# Patient Record
Sex: FEMALE | Race: WHITE | NOT HISPANIC OR LATINO | URBAN - METROPOLITAN AREA
[De-identification: names, ages, dates, MRNs, and addresses within clinical notes are randomized per-mention and may not be internally consistent; named-entity substitution may affect disease eponyms.]

---

## 2021-06-15 ENCOUNTER — APPOINTMENT (RX ONLY)
Dept: URBAN - METROPOLITAN AREA CLINIC 64 | Facility: CLINIC | Age: 86
Setting detail: DERMATOLOGY
End: 2021-06-15

## 2021-06-15 VITALS — DIASTOLIC BLOOD PRESSURE: 78 MMHG | SYSTOLIC BLOOD PRESSURE: 126 MMHG | TEMPERATURE: 98.2 F

## 2021-06-15 DIAGNOSIS — L57.0 ACTINIC KERATOSIS: ICD-10-CM

## 2021-06-15 DIAGNOSIS — L82.1 OTHER SEBORRHEIC KERATOSIS: ICD-10-CM

## 2021-06-15 DIAGNOSIS — L82.0 INFLAMED SEBORRHEIC KERATOSIS: ICD-10-CM

## 2021-06-15 DIAGNOSIS — L81.4 OTHER MELANIN HYPERPIGMENTATION: ICD-10-CM

## 2021-06-15 PROCEDURE — 17110 DESTRUCTION B9 LES UP TO 14: CPT

## 2021-06-15 PROCEDURE — 99213 OFFICE O/P EST LOW 20 MIN: CPT | Mod: 25

## 2021-06-15 PROCEDURE — ? COUNSELING

## 2021-06-15 PROCEDURE — ? LIQUID NITROGEN

## 2021-06-15 ASSESSMENT — LOCATION DETAILED DESCRIPTION DERM
LOCATION DETAILED: LEFT INFERIOR ANTERIOR NECK
LOCATION DETAILED: LEFT SUPERIOR UPPER BACK
LOCATION DETAILED: LEFT FOREHEAD

## 2021-06-15 ASSESSMENT — LOCATION SIMPLE DESCRIPTION DERM
LOCATION SIMPLE: LEFT FOREHEAD
LOCATION SIMPLE: LEFT ANTERIOR NECK
LOCATION SIMPLE: LEFT UPPER BACK

## 2021-06-15 ASSESSMENT — LOCATION ZONE DERM
LOCATION ZONE: NECK
LOCATION ZONE: TRUNK
LOCATION ZONE: FACE

## 2021-06-15 NOTE — PROCEDURE: MIPS QUALITY
Detail Level: Detailed
Quality 317: Preventative Care And Screening: Screening For High Blood Pressure And Follow-Up Documented: Pre-hypertensive or hypertensive blood pressure reading documented, and the indicated follow-up is documented
Additional Notes: Patient referred to PCP.

## 2021-06-15 NOTE — PROCEDURE: LIQUID NITROGEN
Include Z78.9 (Other Specified Conditions Influencing Health Status) As An Associated Diagnosis?: No
Medical Necessity Information: It is in your best interest to select a reason for this procedure from the list below. All of these items fulfill various CMS LCD requirements except the new and changing color options.
Duration Of Freeze Thaw-Cycle (Seconds): 0
Total Number Of Lesions Treated: 7
Medical Necessity Clause: This procedure was medically necessary because the lesions that were treated were:
Consent: The patient's consent was obtained including any potential scarring.
Detail Level: Zone
Post-care instructions were discussed.

## 2022-06-07 ENCOUNTER — APPOINTMENT (RX ONLY)
Dept: URBAN - METROPOLITAN AREA CLINIC 64 | Facility: CLINIC | Age: 87
Setting detail: DERMATOLOGY
End: 2022-06-07

## 2022-06-07 VITALS — HEART RATE: 78 BPM | DIASTOLIC BLOOD PRESSURE: 71 MMHG | TEMPERATURE: 97.8 F | SYSTOLIC BLOOD PRESSURE: 120 MMHG

## 2022-06-07 DIAGNOSIS — L57.0 ACTINIC KERATOSIS: ICD-10-CM

## 2022-06-07 DIAGNOSIS — L81.4 OTHER MELANIN HYPERPIGMENTATION: ICD-10-CM

## 2022-06-07 DIAGNOSIS — L82.1 OTHER SEBORRHEIC KERATOSIS: ICD-10-CM

## 2022-06-07 PROCEDURE — 99213 OFFICE O/P EST LOW 20 MIN: CPT

## 2022-06-07 PROCEDURE — ? COUNSELING

## 2022-06-07 NOTE — PROCEDURE: MIPS QUALITY
Quality 110: Preventive Care And Screening: Influenza Immunization: Influenza Immunization Administered during Influenza season
Quality 317: Preventative Care And Screening: Screening For High Blood Pressure And Follow-Up Documented: Pre-hypertensive or hypertensive blood pressure reading documented, and the indicated follow-up is documented
Detail Level: Detailed
Additional Notes: Patient referred to PCP.
Quality 111:Pneumonia Vaccination Status For Older Adults: Pneumococcal vaccine administered on or after patient’s 60th birthday and before the end of the measurement period

## 2023-06-13 ENCOUNTER — APPOINTMENT (RX ONLY)
Dept: URBAN - METROPOLITAN AREA CLINIC 64 | Facility: CLINIC | Age: 88
Setting detail: DERMATOLOGY
End: 2023-06-13

## 2023-06-13 VITALS — TEMPERATURE: 97.8 F | SYSTOLIC BLOOD PRESSURE: 131 MMHG | HEART RATE: 71 BPM | DIASTOLIC BLOOD PRESSURE: 71 MMHG

## 2023-06-13 DIAGNOSIS — L57.0 ACTINIC KERATOSIS: ICD-10-CM

## 2023-06-13 DIAGNOSIS — L82.1 OTHER SEBORRHEIC KERATOSIS: ICD-10-CM

## 2023-06-13 DIAGNOSIS — L81.4 OTHER MELANIN HYPERPIGMENTATION: ICD-10-CM

## 2023-06-13 DIAGNOSIS — L82.0 INFLAMED SEBORRHEIC KERATOSIS: ICD-10-CM

## 2023-06-13 DIAGNOSIS — L72.8 OTHER FOLLICULAR CYSTS OF THE SKIN AND SUBCUTANEOUS TISSUE: ICD-10-CM

## 2023-06-13 PROCEDURE — 10060 I&D ABSCESS SIMPLE/SINGLE: CPT

## 2023-06-13 PROCEDURE — ? INCISION AND DRAINAGE

## 2023-06-13 PROCEDURE — 99213 OFFICE O/P EST LOW 20 MIN: CPT | Mod: 25

## 2023-06-13 PROCEDURE — 17110 DESTRUCTION B9 LES UP TO 14: CPT

## 2023-06-13 PROCEDURE — ? LIQUID NITROGEN

## 2023-06-13 PROCEDURE — ? COUNSELING

## 2023-06-13 ASSESSMENT — LOCATION SIMPLE DESCRIPTION DERM
LOCATION SIMPLE: LEFT ANTERIOR NECK
LOCATION SIMPLE: RIGHT CHEEK

## 2023-06-13 ASSESSMENT — LOCATION DETAILED DESCRIPTION DERM
LOCATION DETAILED: LEFT CLAVICULAR NECK
LOCATION DETAILED: RIGHT CENTRAL MALAR CHEEK

## 2023-06-13 ASSESSMENT — LOCATION ZONE DERM
LOCATION ZONE: NECK
LOCATION ZONE: FACE

## 2023-06-13 NOTE — PROCEDURE: INCISION AND DRAINAGE
Wound Care: Petrolatum
Preparation Text: The area was prepped in the usual clean fashion.
Method: sterile needle
Anesthesia Volume In Cc: 0
Detail Level: Detailed
Dressing: dry sterile dressing
Curette: No
Consent was obtained.
Post-Care Instructions: I reviewed with the patient in detail post-care instructions. Patient should keep wound covered and call the office should any redness, pain, swelling or worsening occur.
Curette Text (Optional): After the contents were expressed a curette was used to partially remove the cyst wall.
Suture Text: The incision was partially closed with
Size Of Lesion In Cm (Optional But May Be Required For Some Insurances): 0.3
Epidermal Sutures: 4-0 Ethilon
Epidermal Closure: simple interrupted
Body Location Override (Optional - Billing Will Still Be Based On Selected Body Map Location If Applicable): (R) cheek
Lesion Type: Cyst

## 2023-06-13 NOTE — PROCEDURE: LIQUID NITROGEN
Render Post Care In The Note?: no
Post-care instructions were discussed.
Show Spray Paint Technique Variable?: Yes
Total Number Of Lesions Treated: 2
Consent: The patient's consent was obtained including any potential scarring.
Detail Level: Zone
Medical Necessity Clause: This procedure was medically necessary because the lesions that were treated were:
Duration Of Freeze Thaw-Cycle (Seconds): 0
Spray Paint Text: The liquid nitrogen was applied to the skin utilizing a spray paint frosting technique.
Medical Necessity Information: It is in your best interest to select a reason for this procedure from the list below. All of these items fulfill various CMS LCD requirements except the new and changing color options.

## 2023-06-13 NOTE — PROCEDURE: MIPS QUALITY
Additional Notes: Patient referred to PCP
Detail Level: Detailed
Quality 317: Preventative Care And Screening: Screening For High Blood Pressure And Follow-Up Documented: Elevated or hypertensive blood pressure reading documented, and the indicated follow-up is documented

## 2023-08-01 ENCOUNTER — APPOINTMENT (RX ONLY)
Dept: URBAN - METROPOLITAN AREA CLINIC 64 | Facility: CLINIC | Age: 88
Setting detail: DERMATOLOGY
End: 2023-08-01

## 2023-08-01 VITALS — TEMPERATURE: 97.8 F | SYSTOLIC BLOOD PRESSURE: 143 MMHG | DIASTOLIC BLOOD PRESSURE: 72 MMHG | HEART RATE: 70 BPM

## 2023-08-01 DIAGNOSIS — D485 NEOPLASM OF UNCERTAIN BEHAVIOR OF SKIN: ICD-10-CM

## 2023-08-01 PROBLEM — D48.5 NEOPLASM OF UNCERTAIN BEHAVIOR OF SKIN: Status: ACTIVE | Noted: 2023-08-01

## 2023-08-01 PROCEDURE — 11102 TANGNTL BX SKIN SINGLE LES: CPT

## 2023-08-01 PROCEDURE — ? BIOPSY BY SHAVE METHOD

## 2023-08-01 ASSESSMENT — LOCATION SIMPLE DESCRIPTION DERM: LOCATION SIMPLE: SCALP

## 2023-08-01 ASSESSMENT — LOCATION ZONE DERM: LOCATION ZONE: SCALP

## 2023-08-01 ASSESSMENT — LOCATION DETAILED DESCRIPTION DERM: LOCATION DETAILED: RIGHT SUPERIOR PARIETAL SCALP

## 2023-08-01 NOTE — PROCEDURE: MIPS QUALITY
Additional Notes: Patient referred to PCP.
Detail Level: Detailed
Quality 317: Preventative Care And Screening: Screening For High Blood Pressure And Follow-Up Documented: Elevated or hypertensive blood pressure reading documented, and the indicated follow-up is documented

## 2023-08-01 NOTE — PROCEDURE: BIOPSY BY SHAVE METHOD
Information: Selecting Yes will display possible errors in your note based on the variables you have selected. This validation is only offered as a suggestion for you. PLEASE NOTE THAT THE VALIDATION TEXT WILL BE REMOVED WHEN YOU FINALIZE YOUR NOTE. IF YOU WANT TO FAX A PRELIMINARY NOTE YOU WILL NEED TO TOGGLE THIS TO 'NO' IF YOU DO NOT WANT IT IN YOUR FAXED NOTE.
Anesthesia Volume In Cc: 0.5
Path Notes (To The Dermatopathologist): Erythematous papule. R/O BCC
Was A Bandage Applied: Yes
Cryotherapy Text: The wound bed was treated with cryotherapy after the biopsy was performed.
Billing Type: Third-Party Bill
Bill 95122 For Specimen Handling/Conveyance To Laboratory?: no
Body Location Override (Optional - Billing Will Still Be Based On Selected Body Map Location If Applicable): (R) medial vertex scalp
Type Of Destruction Used: Curettage
Dressing: bandage
Detail Level: Detailed
Consent: Written consent was obtained and risks were reviewed including potential scarring.
Curettage Text: The wound bed was treated with curettage after the biopsy was performed.
Notification Instructions: Patient will call the office in one week for bx results
Biopsy Method: 15 blade
Wound Care: Petrolatum
Additional Anesthesia Volume In Cc (Will Not Render If 0): 0
Electrodesiccation Text: The wound bed was treated with electrodesiccation after the biopsy was performed.
Depth Of Biopsy: dermis
Anesthesia Type: 1% lidocaine with 1:200,000 epinephrine and a 1:10 solution of 8.4% sodium bicarbonate
Biopsy Type: H and E
Silver Nitrate Text: The wound bed was treated with silver nitrate after the biopsy was performed.
Electrodesiccation And Curettage Text: The wound bed was treated with electrodesiccation and curettage after the biopsy was performed.
Size Of Lesion In Cm: 0.4
Post-Care Instructions: The patient was given post-care instructions. Patient is to keep the biopsy site dry overnight, and then apply petrolatum twice daily until healed. May cover with a bandaid
Hemostasis: Aluminum Chloride

## 2023-12-12 ENCOUNTER — APPOINTMENT (RX ONLY)
Dept: URBAN - METROPOLITAN AREA CLINIC 65 | Facility: CLINIC | Age: 88
Setting detail: DERMATOLOGY
End: 2023-12-12

## 2023-12-12 DIAGNOSIS — L57.0 ACTINIC KERATOSIS: ICD-10-CM

## 2023-12-12 DIAGNOSIS — L82.0 INFLAMED SEBORRHEIC KERATOSIS: ICD-10-CM

## 2023-12-12 DIAGNOSIS — L81.4 OTHER MELANIN HYPERPIGMENTATION: ICD-10-CM

## 2023-12-12 DIAGNOSIS — L82.1 OTHER SEBORRHEIC KERATOSIS: ICD-10-CM

## 2023-12-12 PROCEDURE — 99213 OFFICE O/P EST LOW 20 MIN: CPT | Mod: 25

## 2023-12-12 PROCEDURE — ? COUNSELING

## 2023-12-12 PROCEDURE — 17110 DESTRUCTION B9 LES UP TO 14: CPT

## 2023-12-12 PROCEDURE — ? LIQUID NITROGEN

## 2023-12-12 ASSESSMENT — LOCATION SIMPLE DESCRIPTION DERM: LOCATION SIMPLE: RIGHT FOREHEAD

## 2023-12-12 ASSESSMENT — LOCATION DETAILED DESCRIPTION DERM: LOCATION DETAILED: RIGHT SUPERIOR FOREHEAD

## 2023-12-12 ASSESSMENT — LOCATION ZONE DERM: LOCATION ZONE: FACE

## 2023-12-12 NOTE — PROCEDURE: LIQUID NITROGEN
Spray Paint Technique: No
Consent: The patient's consent was obtained including but not limited to risks of crusting, scabbing, blistering, scarring, darker or lighter pigmentary change, recurrence, incomplete removal and infection.
Medical Necessity Clause: This procedure was medically necessary because the lesions that were treated were:
Spray Paint Text: The liquid nitrogen was applied to the skin utilizing a spray paint frosting technique.
Duration Of Freeze Thaw-Cycle (Seconds): 0
Total Number Of Lesions Treated: 1
Show Spray Paint Technique Variable?: Yes
Detail Level: Zone
Medical Necessity Information: It is in your best interest to select a reason for this procedure from the list below. All of these items fulfill various CMS LCD requirements except the new and changing color options.
Post-Care Instructions: I reviewed with the patient in detail post-care instructions. Patient is to wear sunprotection, and avoid picking at any of the treated lesions. Pt may apply Vaseline to crusted or scabbing areas.

## 2024-03-13 ENCOUNTER — APPOINTMENT (RX ONLY)
Dept: URBAN - METROPOLITAN AREA CLINIC 65 | Facility: CLINIC | Age: 89
Setting detail: DERMATOLOGY
End: 2024-03-13

## 2024-03-13 DIAGNOSIS — L0390 CELLULITIS AND ABSCESS OF UNSPECIFIED SITES: ICD-10-CM

## 2024-03-13 DIAGNOSIS — L0391 CELLULITIS AND ABSCESS OF UNSPECIFIED SITES: ICD-10-CM

## 2024-03-13 PROBLEM — L02.811 CUTANEOUS ABSCESS OF HEAD [ANY PART, EXCEPT FACE]: Status: ACTIVE | Noted: 2024-03-13

## 2024-03-13 PROCEDURE — ? INCISION AND DRAINAGE

## 2024-03-13 PROCEDURE — 10060 I&D ABSCESS SIMPLE/SINGLE: CPT

## 2024-03-13 PROCEDURE — ? PRESCRIPTION

## 2024-03-13 PROCEDURE — ? ORDER TESTS

## 2024-03-13 RX ORDER — CEPHALEXIN 500 MG/1
CAPSULE ORAL TID
Qty: 21 | Refills: 0 | Status: ERX | COMMUNITY
Start: 2024-03-13

## 2024-03-13 RX ADMIN — CEPHALEXIN: 500 CAPSULE ORAL at 00:00

## 2024-03-13 ASSESSMENT — LOCATION DETAILED DESCRIPTION DERM: LOCATION DETAILED: RIGHT SUPERIOR PARIETAL SCALP

## 2024-03-13 ASSESSMENT — LOCATION ZONE DERM: LOCATION ZONE: SCALP

## 2024-03-13 ASSESSMENT — LOCATION SIMPLE DESCRIPTION DERM: LOCATION SIMPLE: SCALP

## 2024-03-13 NOTE — PROCEDURE: ORDER TESTS
Bill For Surgical Tray: no
Performing Laboratory: 0
Expected Date Of Service: 03/15/2024
Billing Type: Third-Party Bill

## 2024-06-12 ENCOUNTER — APPOINTMENT (RX ONLY)
Dept: URBAN - METROPOLITAN AREA CLINIC 65 | Facility: CLINIC | Age: 89
Setting detail: DERMATOLOGY
End: 2024-06-12

## 2024-06-12 DIAGNOSIS — L81.4 OTHER MELANIN HYPERPIGMENTATION: ICD-10-CM

## 2024-06-12 DIAGNOSIS — L82.0 INFLAMED SEBORRHEIC KERATOSIS: ICD-10-CM

## 2024-06-12 DIAGNOSIS — L82.1 OTHER SEBORRHEIC KERATOSIS: ICD-10-CM

## 2024-06-12 DIAGNOSIS — L57.0 ACTINIC KERATOSIS: ICD-10-CM

## 2024-06-12 PROCEDURE — 99213 OFFICE O/P EST LOW 20 MIN: CPT | Mod: 25

## 2024-06-12 PROCEDURE — 17110 DESTRUCTION B9 LES UP TO 14: CPT

## 2024-06-12 PROCEDURE — ? COUNSELING

## 2024-06-12 PROCEDURE — ? LIQUID NITROGEN

## 2024-06-12 ASSESSMENT — LOCATION SIMPLE DESCRIPTION DERM: LOCATION SIMPLE: RIGHT CALF

## 2024-06-12 ASSESSMENT — LOCATION ZONE DERM: LOCATION ZONE: LEG

## 2024-06-12 ASSESSMENT — LOCATION DETAILED DESCRIPTION DERM: LOCATION DETAILED: RIGHT DISTAL CALF

## 2024-06-12 NOTE — PROCEDURE: LIQUID NITROGEN
Spray Paint Technique: No
Medical Necessity Clause: This procedure was medically necessary because the lesions that were treated were:
Medical Necessity Information: It is in your best interest to select a reason for this procedure from the list below. All of these items fulfill various CMS LCD requirements except the new and changing color options.
Spray Paint Text: The liquid nitrogen was applied to the skin utilizing a spray paint frosting technique.
Detail Level: Zone
Total Number Of Lesions Treated: 1
Show Spray Paint Technique Variable?: Yes
Post-Care Instructions: I reviewed with the patient in detail post-care instructions. Patient is to wear sunprotection, and avoid picking at any of the treated lesions. Pt may apply Vaseline to crusted or scabbing areas.
Consent: The patient's consent was obtained including but not limited to risks of crusting, scabbing, blistering, scarring, darker or lighter pigmentary change, recurrence, incomplete removal and infection.
Duration Of Freeze Thaw-Cycle (Seconds): 0

## 2024-06-12 NOTE — PROCEDURE: MIPS QUALITY
Quality 226: Preventive Care And Screening: Tobacco Use: Screening And Cessation Intervention: Patient screened for tobacco use and is an ex/non-smoker
Detail Level: Detailed
Quality 130: Documentation Of Current Medications In The Medical Record: Current Medications Documented
Quality 431: Preventive Care And Screening: Unhealthy Alcohol Use - Screening: Patient not identified as an unhealthy alcohol user when screened for unhealthy alcohol use using a systematic screening method
WDL

## 2024-12-11 ENCOUNTER — APPOINTMENT (OUTPATIENT)
Dept: URBAN - METROPOLITAN AREA CLINIC 65 | Facility: CLINIC | Age: 89
Setting detail: DERMATOLOGY
End: 2024-12-11

## 2024-12-11 DIAGNOSIS — L81.4 OTHER MELANIN HYPERPIGMENTATION: ICD-10-CM

## 2024-12-11 DIAGNOSIS — L82.0 INFLAMED SEBORRHEIC KERATOSIS: ICD-10-CM

## 2024-12-11 DIAGNOSIS — L57.0 ACTINIC KERATOSIS: ICD-10-CM

## 2024-12-11 DIAGNOSIS — L82.1 OTHER SEBORRHEIC KERATOSIS: ICD-10-CM

## 2024-12-11 PROCEDURE — 99213 OFFICE O/P EST LOW 20 MIN: CPT | Mod: 25

## 2024-12-11 PROCEDURE — 17111 DESTRUCTION B9 LESIONS 15/>: CPT

## 2024-12-11 PROCEDURE — ? LIQUID NITROGEN

## 2024-12-11 PROCEDURE — ? COUNSELING

## 2024-12-11 ASSESSMENT — LOCATION SIMPLE DESCRIPTION DERM: LOCATION SIMPLE: SUPERIOR FOREHEAD

## 2024-12-11 ASSESSMENT — LOCATION ZONE DERM: LOCATION ZONE: FACE

## 2024-12-11 ASSESSMENT — LOCATION DETAILED DESCRIPTION DERM: LOCATION DETAILED: SUPERIOR MID FOREHEAD

## 2025-06-12 ENCOUNTER — APPOINTMENT (OUTPATIENT)
Dept: URBAN - METROPOLITAN AREA CLINIC 65 | Facility: CLINIC | Age: OVER 89
Setting detail: DERMATOLOGY
End: 2025-06-12

## 2025-06-12 DIAGNOSIS — L82.1 OTHER SEBORRHEIC KERATOSIS: ICD-10-CM

## 2025-06-12 DIAGNOSIS — L82.0 INFLAMED SEBORRHEIC KERATOSIS: ICD-10-CM

## 2025-06-12 DIAGNOSIS — L57.0 ACTINIC KERATOSIS: ICD-10-CM

## 2025-06-12 DIAGNOSIS — L81.4 OTHER MELANIN HYPERPIGMENTATION: ICD-10-CM

## 2025-06-12 PROCEDURE — ? LIQUID NITROGEN

## 2025-06-12 PROCEDURE — ? COUNSELING

## 2025-06-12 ASSESSMENT — LOCATION SIMPLE DESCRIPTION DERM: LOCATION SIMPLE: RIGHT FOREHEAD

## 2025-06-12 ASSESSMENT — LOCATION DETAILED DESCRIPTION DERM: LOCATION DETAILED: RIGHT FOREHEAD

## 2025-06-12 ASSESSMENT — LOCATION ZONE DERM: LOCATION ZONE: FACE

## 2025-06-12 NOTE — PROCEDURE: LIQUID NITROGEN
Consent: The patient's consent was obtained including but not limited to risks of crusting, scabbing, blistering, scarring, darker or lighter pigmentary change, recurrence, incomplete removal and infection.
Detail Level: Zone
Render Post Care In The Note?: yes
Post-Care Instructions: I reviewed with the patient in detail post-care instructions. Patient is to wear sunprotection, and avoid picking at any of the treated lesions. Pt may apply Vaseline to crusted or scabbing areas.
Total Number Of Lesions Treated: 2
Medical Necessity Information: It is in your best interest to select a reason for this procedure from the list below. All of these items fulfill various CMS LCD requirements except the new and changing color options.
Medical Necessity Clause: This procedure was medically necessary because the lesions that were treated were:
Duration Of Freeze Thaw-Cycle (Seconds): 0
Spray Paint Text: The liquid nitrogen was applied to the skin utilizing a spray paint frosting technique.
Add 52 Modifier (Optional): no